# Patient Record
Sex: FEMALE | Race: WHITE | Employment: OTHER | URBAN - METROPOLITAN AREA
[De-identification: names, ages, dates, MRNs, and addresses within clinical notes are randomized per-mention and may not be internally consistent; named-entity substitution may affect disease eponyms.]

---

## 2017-03-17 ENCOUNTER — HOSPITAL ENCOUNTER (OUTPATIENT)
Dept: OTHER | Age: 82
Discharge: HOME | End: 2017-03-17
Attending: INTERNAL MEDICINE | Admitting: INTERNAL MEDICINE

## 2017-03-17 LAB
A/G RATIO: 1.4 (ref 1.1–2.2)
ALBUMIN SERPL-MCNC: 4.2 G/DL (ref 3.4–5)
ALP BLD-CCNC: 146 U/L (ref 40–129)
ALT SERPL-CCNC: 8 U/L (ref 10–40)
ANION GAP SERPL CALCULATED.3IONS-SCNC: 14 MMOL/L (ref 3–16)
AST SERPL-CCNC: 14 U/L (ref 15–37)
BASOPHILS ABSOLUTE: 0.1 K/UL (ref 0–0.2)
BASOPHILS RELATIVE PERCENT: 1 %
BILIRUB SERPL-MCNC: 0.3 MG/DL (ref 0–1)
BILIRUBIN URINE: NEGATIVE
BLOOD, URINE: NEGATIVE
BUN BLDV-MCNC: 14 MG/DL (ref 7–20)
CALCIUM SERPL-MCNC: 9.1 MG/DL (ref 8.3–10.6)
CHLORIDE BLD-SCNC: 97 MMOL/L (ref 99–110)
CHOLESTEROL, TOTAL: 155 MG/DL (ref 0–199)
CLARITY: CLEAR
CO2: 24 MMOL/L (ref 21–32)
COLOR: YELLOW
CREAT SERPL-MCNC: 0.7 MG/DL (ref 0.6–1.2)
EOSINOPHILS ABSOLUTE: 0.2 K/UL (ref 0–0.6)
EOSINOPHILS RELATIVE PERCENT: 2.4 %
EPITHELIAL CELLS, UA: 1 /HPF (ref 0–5)
GFR AFRICAN AMERICAN: >60
GFR NON-AFRICAN AMERICAN: >60
GLOBULIN: 3 G/DL
GLUCOSE BLD-MCNC: 87 MG/DL (ref 70–99)
GLUCOSE URINE: NEGATIVE MG/DL
HCT VFR BLD CALC: 36.8 % (ref 36–48)
HDLC SERPL-MCNC: 56 MG/DL (ref 40–60)
HEMOGLOBIN: 12.1 G/DL (ref 12–16)
HYALINE CASTS: 2 /HPF (ref 0–8)
KETONES, URINE: NEGATIVE MG/DL
LDL CHOLESTEROL CALCULATED: 82 MG/DL
LEUKOCYTE ESTERASE, URINE: ABNORMAL
LYMPHOCYTES ABSOLUTE: 1.5 K/UL (ref 1–5.1)
LYMPHOCYTES RELATIVE PERCENT: 20.6 %
MCH RBC QN AUTO: 28.7 PG (ref 26–34)
MCHC RBC AUTO-ENTMCNC: 32.7 G/DL (ref 31–36)
MCV RBC AUTO: 87.8 FL (ref 80–100)
MICROSCOPIC EXAMINATION: YES
MONOCYTES ABSOLUTE: 0.5 K/UL (ref 0–1.3)
MONOCYTES RELATIVE PERCENT: 6.9 %
NEUTROPHILS ABSOLUTE: 4.9 K/UL (ref 1.7–7.7)
NEUTROPHILS RELATIVE PERCENT: 69.1 %
NITRITE, URINE: NEGATIVE
PDW BLD-RTO: 14.8 % (ref 12.4–15.4)
PH UA: 6
PLATELET # BLD: 330 K/UL (ref 135–450)
PMV BLD AUTO: 7.2 FL (ref 5–10.5)
POTASSIUM SERPL-SCNC: 4.2 MMOL/L (ref 3.5–5.1)
PROTEIN UA: NEGATIVE MG/DL
RBC # BLD: 4.2 M/UL (ref 4–5.2)
RBC UA: 2 /HPF (ref 0–4)
SODIUM BLD-SCNC: 135 MMOL/L (ref 136–145)
SPECIFIC GRAVITY UA: 1.02
TOTAL PROTEIN: 7.2 G/DL (ref 6.4–8.2)
TRIGL SERPL-MCNC: 83 MG/DL (ref 0–150)
TSH SERPL DL<=0.05 MIU/L-ACNC: 1.58 UIU/ML (ref 0.27–4.2)
URINE TYPE: ABNORMAL
UROBILINOGEN, URINE: 0.2 E.U./DL
VLDLC SERPL CALC-MCNC: 17 MG/DL
WBC # BLD: 7.1 K/UL (ref 4–11)
WBC UA: 3 /HPF (ref 0–5)

## 2019-04-05 PROBLEM — R26.81 UNSTEADY GAIT: Status: ACTIVE | Noted: 2019-04-05

## 2019-04-05 PROBLEM — G47.00 INSOMNIA: Status: ACTIVE | Noted: 2019-04-05

## 2019-04-05 PROBLEM — K21.9 ACID REFLUX: Status: ACTIVE | Noted: 2018-12-18

## 2019-04-05 PROBLEM — E04.9 GOITER: Status: ACTIVE | Noted: 2019-04-05

## 2019-04-05 PROBLEM — M81.0 OSTEOPOROSIS: Status: ACTIVE | Noted: 2019-04-05

## 2019-04-05 PROBLEM — M15.9 GENERALIZED OA: Status: ACTIVE | Noted: 2019-04-05

## 2019-04-05 PROBLEM — Z71.89 ADVANCE DIRECTIVE DISCUSSED WITH PATIENT: Status: ACTIVE | Noted: 2019-04-05

## 2019-04-05 PROBLEM — E87.1 HYPONATREMIA: Status: ACTIVE | Noted: 2017-08-26

## 2019-04-09 ENCOUNTER — HOSPITAL ENCOUNTER (OUTPATIENT)
Age: 84
Discharge: HOME | End: 2019-04-09
Payer: MEDICARE

## 2019-04-09 LAB
ALBUMIN SERPL-MCNC: 4.4 G/DL (ref 3.4–5)
ANION GAP SERPL CALCULATED.3IONS-SCNC: 14 MMOL/L (ref 3–16)
BUN BLDV-MCNC: 11 MG/DL (ref 7–20)
CALCIUM SERPL-MCNC: 8.8 MG/DL (ref 8.3–10.6)
CHLORIDE BLD-SCNC: 99 MMOL/L (ref 99–110)
CO2: 26 MMOL/L (ref 21–32)
CREAT SERPL-MCNC: 0.6 MG/DL (ref 0.6–1.2)
CREATININE URINE: 72.1 MG/DL (ref 28–259)
GFR AFRICAN AMERICAN: >60
GFR NON-AFRICAN AMERICAN: >60
GLUCOSE BLD-MCNC: 95 MG/DL (ref 70–99)
HCT VFR BLD CALC: 36.4 % (ref 36–48)
HEMOGLOBIN: 11.9 G/DL (ref 12–16)
MCH RBC QN AUTO: 27.8 PG (ref 26–34)
MCHC RBC AUTO-ENTMCNC: 32.7 G/DL (ref 31–36)
MCV RBC AUTO: 84.9 FL (ref 80–100)
PDW BLD-RTO: 16.2 % (ref 12.4–15.4)
PHOSPHORUS: 3.2 MG/DL (ref 2.5–4.9)
PLATELET # BLD: 426 K/UL (ref 135–450)
PMV BLD AUTO: 7.5 FL (ref 5–10.5)
POTASSIUM SERPL-SCNC: 3.8 MMOL/L (ref 3.5–5.1)
PROTEIN PROTEIN: 9 MG/DL
RBC # BLD: 4.29 M/UL (ref 4–5.2)
SODIUM BLD-SCNC: 139 MMOL/L (ref 136–145)
WBC # BLD: 7.7 K/UL (ref 4–11)

## 2019-04-09 PROCEDURE — 80069 RENAL FUNCTION PANEL: CPT

## 2019-04-09 PROCEDURE — 82570 ASSAY OF URINE CREATININE: CPT

## 2019-04-09 PROCEDURE — 84156 ASSAY OF PROTEIN URINE: CPT

## 2019-04-09 PROCEDURE — 85027 COMPLETE CBC AUTOMATED: CPT

## 2019-04-09 PROCEDURE — 36415 COLL VENOUS BLD VENIPUNCTURE: CPT

## 2021-07-24 ENCOUNTER — APPOINTMENT (OUTPATIENT)
Dept: CT IMAGING | Age: 86
End: 2021-07-24
Payer: MEDICARE

## 2021-07-24 ENCOUNTER — HOSPITAL ENCOUNTER (OUTPATIENT)
Age: 86
Setting detail: OBSERVATION
Discharge: HOME | End: 2021-07-26
Attending: INTERNAL MEDICINE | Admitting: INTERNAL MEDICINE
Payer: MEDICARE

## 2021-07-24 ENCOUNTER — APPOINTMENT (OUTPATIENT)
Dept: GENERAL RADIOLOGY | Age: 86
End: 2021-07-24
Payer: MEDICARE

## 2021-07-24 DIAGNOSIS — R77.8 ELEVATED TROPONIN: ICD-10-CM

## 2021-07-24 DIAGNOSIS — I44.7 LBBB (LEFT BUNDLE BRANCH BLOCK): ICD-10-CM

## 2021-07-24 DIAGNOSIS — A41.9 SEPTICEMIA (HCC): Primary | ICD-10-CM

## 2021-07-24 DIAGNOSIS — N30.00 ACUTE CYSTITIS WITHOUT HEMATURIA: ICD-10-CM

## 2021-07-24 PROBLEM — N39.0 UTI (URINARY TRACT INFECTION): Status: ACTIVE | Noted: 2021-07-24

## 2021-07-24 LAB
A/G RATIO: 1.3 (ref 1.1–2.2)
ALBUMIN SERPL-MCNC: 3.9 G/DL (ref 3.4–5)
ALP BLD-CCNC: 92 U/L (ref 40–129)
ALT SERPL-CCNC: 12 U/L (ref 10–40)
ANION GAP SERPL CALCULATED.3IONS-SCNC: 11 MMOL/L (ref 3–16)
AST SERPL-CCNC: 23 U/L (ref 15–37)
BACTERIA: ABNORMAL /HPF
BASOPHILS ABSOLUTE: 0.1 K/UL (ref 0–0.2)
BASOPHILS RELATIVE PERCENT: 0.5 %
BILIRUB SERPL-MCNC: 0.4 MG/DL (ref 0–1)
BILIRUBIN URINE: NEGATIVE
BLOOD, URINE: NEGATIVE
BUN BLDV-MCNC: 9 MG/DL (ref 7–20)
CALCIUM SERPL-MCNC: 9.4 MG/DL (ref 8.3–10.6)
CHLORIDE BLD-SCNC: 98 MMOL/L (ref 99–110)
CLARITY: ABNORMAL
CO2: 26 MMOL/L (ref 21–32)
COLOR: YELLOW
CREAT SERPL-MCNC: 0.7 MG/DL (ref 0.6–1.2)
EOSINOPHILS ABSOLUTE: 0.1 K/UL (ref 0–0.6)
EOSINOPHILS RELATIVE PERCENT: 0.5 %
EPITHELIAL CELLS, UA: 1 /HPF (ref 0–5)
GFR AFRICAN AMERICAN: >60
GFR NON-AFRICAN AMERICAN: >60
GLOBULIN: 3 G/DL
GLUCOSE BLD-MCNC: 109 MG/DL (ref 70–99)
GLUCOSE BLD-MCNC: 96 MG/DL (ref 70–99)
GLUCOSE URINE: NEGATIVE MG/DL
HCT VFR BLD CALC: 30.9 % (ref 36–48)
HEMOGLOBIN: 10.4 G/DL (ref 12–16)
HYALINE CASTS: 3 /LPF (ref 0–8)
KETONES, URINE: NEGATIVE MG/DL
LACTIC ACID, SEPSIS: 1 MMOL/L (ref 0.4–1.9)
LEUKOCYTE ESTERASE, URINE: ABNORMAL
LIPASE: 19 U/L (ref 13–60)
LYMPHOCYTES ABSOLUTE: 0.3 K/UL (ref 1–5.1)
LYMPHOCYTES RELATIVE PERCENT: 2.4 %
MCH RBC QN AUTO: 29.3 PG (ref 26–34)
MCHC RBC AUTO-ENTMCNC: 33.6 G/DL (ref 31–36)
MCV RBC AUTO: 87.2 FL (ref 80–100)
MICROSCOPIC EXAMINATION: YES
MONOCYTES ABSOLUTE: 0.5 K/UL (ref 0–1.3)
MONOCYTES RELATIVE PERCENT: 4.1 %
NEUTROPHILS ABSOLUTE: 10.5 K/UL (ref 1.7–7.7)
NEUTROPHILS RELATIVE PERCENT: 92.5 %
NITRITE, URINE: POSITIVE
PDW BLD-RTO: 15.3 % (ref 12.4–15.4)
PERFORMED ON: ABNORMAL
PH UA: 7 (ref 5–8)
PLATELET # BLD: 304 K/UL (ref 135–450)
PMV BLD AUTO: 6.8 FL (ref 5–10.5)
POTASSIUM SERPL-SCNC: 3.5 MMOL/L (ref 3.5–5.1)
PRO-BNP: 533 PG/ML (ref 0–449)
PROTEIN UA: NEGATIVE MG/DL
RBC # BLD: 3.54 M/UL (ref 4–5.2)
RBC UA: 2 /HPF (ref 0–4)
SODIUM BLD-SCNC: 135 MMOL/L (ref 136–145)
SPECIFIC GRAVITY UA: 1.01 (ref 1–1.03)
TOTAL PROTEIN: 6.9 G/DL (ref 6.4–8.2)
TROPONIN: 0.03 NG/ML
URINE REFLEX TO CULTURE: YES
URINE TYPE: ABNORMAL
UROBILINOGEN, URINE: 0.2 E.U./DL
WBC # BLD: 11.4 K/UL (ref 4–11)
WBC UA: 14 /HPF (ref 0–5)

## 2021-07-24 PROCEDURE — 36415 COLL VENOUS BLD VENIPUNCTURE: CPT

## 2021-07-24 PROCEDURE — 2580000003 HC RX 258: Performed by: INTERNAL MEDICINE

## 2021-07-24 PROCEDURE — 83690 ASSAY OF LIPASE: CPT

## 2021-07-24 PROCEDURE — 87186 SC STD MICRODIL/AGAR DIL: CPT

## 2021-07-24 PROCEDURE — 99284 EMERGENCY DEPT VISIT MOD MDM: CPT

## 2021-07-24 PROCEDURE — 87088 URINE BACTERIA CULTURE: CPT

## 2021-07-24 PROCEDURE — 6360000002 HC RX W HCPCS: Performed by: INTERNAL MEDICINE

## 2021-07-24 PROCEDURE — 96375 TX/PRO/DX INJ NEW DRUG ADDON: CPT

## 2021-07-24 PROCEDURE — 2580000003 HC RX 258: Performed by: PHYSICIAN ASSISTANT

## 2021-07-24 PROCEDURE — 96365 THER/PROPH/DIAG IV INF INIT: CPT

## 2021-07-24 PROCEDURE — G0378 HOSPITAL OBSERVATION PER HR: HCPCS

## 2021-07-24 PROCEDURE — 85025 COMPLETE CBC W/AUTO DIFF WBC: CPT

## 2021-07-24 PROCEDURE — 71045 X-RAY EXAM CHEST 1 VIEW: CPT

## 2021-07-24 PROCEDURE — 6360000004 HC RX CONTRAST MEDICATION: Performed by: PHYSICIAN ASSISTANT

## 2021-07-24 PROCEDURE — 6370000000 HC RX 637 (ALT 250 FOR IP): Performed by: INTERNAL MEDICINE

## 2021-07-24 PROCEDURE — 96361 HYDRATE IV INFUSION ADD-ON: CPT

## 2021-07-24 PROCEDURE — 1200000000 HC SEMI PRIVATE

## 2021-07-24 PROCEDURE — 87040 BLOOD CULTURE FOR BACTERIA: CPT

## 2021-07-24 PROCEDURE — 6370000000 HC RX 637 (ALT 250 FOR IP): Performed by: PHYSICIAN ASSISTANT

## 2021-07-24 PROCEDURE — 83880 ASSAY OF NATRIURETIC PEPTIDE: CPT

## 2021-07-24 PROCEDURE — 81001 URINALYSIS AUTO W/SCOPE: CPT

## 2021-07-24 PROCEDURE — 83605 ASSAY OF LACTIC ACID: CPT

## 2021-07-24 PROCEDURE — 93005 ELECTROCARDIOGRAM TRACING: CPT | Performed by: INTERNAL MEDICINE

## 2021-07-24 PROCEDURE — 87086 URINE CULTURE/COLONY COUNT: CPT

## 2021-07-24 PROCEDURE — 6360000002 HC RX W HCPCS: Performed by: PHYSICIAN ASSISTANT

## 2021-07-24 PROCEDURE — 80053 COMPREHEN METABOLIC PANEL: CPT

## 2021-07-24 PROCEDURE — 74177 CT ABD & PELVIS W/CONTRAST: CPT

## 2021-07-24 PROCEDURE — 84484 ASSAY OF TROPONIN QUANT: CPT

## 2021-07-24 RX ORDER — NAPROXEN 250 MG/1
500 TABLET ORAL 2 TIMES DAILY WITH MEALS
Status: DISCONTINUED | OUTPATIENT
Start: 2021-07-24 | End: 2021-07-24

## 2021-07-24 RX ORDER — 0.9 % SODIUM CHLORIDE 0.9 %
30 INTRAVENOUS SOLUTION INTRAVENOUS ONCE
Status: COMPLETED | OUTPATIENT
Start: 2021-07-24 | End: 2021-07-24

## 2021-07-24 RX ORDER — DONEPEZIL HYDROCHLORIDE 5 MG/1
5 TABLET, FILM COATED ORAL NIGHTLY
Status: DISCONTINUED | OUTPATIENT
Start: 2021-07-24 | End: 2021-07-26 | Stop reason: HOSPADM

## 2021-07-24 RX ORDER — DONEPEZIL HYDROCHLORIDE 5 MG/1
5 TABLET, FILM COATED ORAL NIGHTLY
COMMUNITY

## 2021-07-24 RX ORDER — M-VIT,TX,IRON,MINS/CALC/FOLIC 27MG-0.4MG
1 TABLET ORAL DAILY
Status: DISCONTINUED | OUTPATIENT
Start: 2021-07-25 | End: 2021-07-26 | Stop reason: HOSPADM

## 2021-07-24 RX ORDER — AMLODIPINE BESYLATE 5 MG/1
10 TABLET ORAL DAILY
Status: DISCONTINUED | OUTPATIENT
Start: 2021-07-24 | End: 2021-07-24

## 2021-07-24 RX ORDER — OYSTER SHELL CALCIUM WITH VITAMIN D 500; 200 MG/1; [IU]/1
1 TABLET, FILM COATED ORAL DAILY
Status: DISCONTINUED | OUTPATIENT
Start: 2021-07-25 | End: 2021-07-26 | Stop reason: HOSPADM

## 2021-07-24 RX ORDER — HYDROCODONE BITARTRATE AND ACETAMINOPHEN 5; 325 MG/1; MG/1
1 TABLET ORAL EVERY 6 HOURS PRN
Status: DISCONTINUED | OUTPATIENT
Start: 2021-07-24 | End: 2021-07-24

## 2021-07-24 RX ORDER — LISINOPRIL 5 MG/1
5 TABLET ORAL DAILY
Status: DISCONTINUED | OUTPATIENT
Start: 2021-07-25 | End: 2021-07-25

## 2021-07-24 RX ORDER — ACETAMINOPHEN 500 MG
1000 TABLET ORAL ONCE
Status: COMPLETED | OUTPATIENT
Start: 2021-07-24 | End: 2021-07-24

## 2021-07-24 RX ORDER — PANTOPRAZOLE SODIUM 40 MG/1
40 TABLET, DELAYED RELEASE ORAL
Status: DISCONTINUED | OUTPATIENT
Start: 2021-07-25 | End: 2021-07-24

## 2021-07-24 RX ORDER — ASPIRIN 81 MG/1
81 TABLET, CHEWABLE ORAL DAILY
COMMUNITY

## 2021-07-24 RX ORDER — METOPROLOL SUCCINATE 50 MG/1
50 TABLET, EXTENDED RELEASE ORAL DAILY
Status: DISCONTINUED | OUTPATIENT
Start: 2021-07-24 | End: 2021-07-24

## 2021-07-24 RX ORDER — M-VIT,TX,IRON,MINS/CALC/FOLIC 27MG-0.4MG
1 TABLET ORAL DAILY
COMMUNITY

## 2021-07-24 RX ORDER — ASPIRIN 81 MG/1
324 TABLET, CHEWABLE ORAL ONCE
Status: COMPLETED | OUTPATIENT
Start: 2021-07-24 | End: 2021-07-24

## 2021-07-24 RX ORDER — LISINOPRIL AND HYDROCHLOROTHIAZIDE 20; 12.5 MG/1; MG/1
1 TABLET ORAL DAILY
Status: DISCONTINUED | OUTPATIENT
Start: 2021-07-24 | End: 2021-07-24

## 2021-07-24 RX ORDER — SODIUM CHLORIDE 1000 MG
1 TABLET, SOLUBLE MISCELLANEOUS 2 TIMES DAILY WITH MEALS
Status: DISCONTINUED | OUTPATIENT
Start: 2021-07-24 | End: 2021-07-24

## 2021-07-24 RX ORDER — ACETAMINOPHEN 325 MG/1
650 TABLET ORAL EVERY 4 HOURS PRN
Status: ON HOLD | COMMUNITY
End: 2021-07-26 | Stop reason: HOSPADM

## 2021-07-24 RX ORDER — LEVOTHYROXINE SODIUM 112 UG/1
112 TABLET ORAL DAILY
Status: DISCONTINUED | OUTPATIENT
Start: 2021-07-25 | End: 2021-07-26 | Stop reason: HOSPADM

## 2021-07-24 RX ORDER — FENOFIBRATE 160 MG/1
160 TABLET ORAL DAILY
Status: DISCONTINUED | OUTPATIENT
Start: 2021-07-24 | End: 2021-07-24

## 2021-07-24 RX ORDER — MONTELUKAST SODIUM 10 MG/1
10 TABLET ORAL NIGHTLY
Status: DISCONTINUED | OUTPATIENT
Start: 2021-07-24 | End: 2021-07-26 | Stop reason: HOSPADM

## 2021-07-24 RX ORDER — OXYCODONE HYDROCHLORIDE 5 MG/1
5 TABLET ORAL EVERY 4 HOURS PRN
Status: DISCONTINUED | OUTPATIENT
Start: 2021-07-24 | End: 2021-07-24

## 2021-07-24 RX ORDER — SERTRALINE HYDROCHLORIDE 100 MG/1
100 TABLET, FILM COATED ORAL DAILY
COMMUNITY

## 2021-07-24 RX ORDER — ASPIRIN 81 MG/1
81 TABLET, CHEWABLE ORAL DAILY
Status: DISCONTINUED | OUTPATIENT
Start: 2021-07-24 | End: 2021-07-26 | Stop reason: HOSPADM

## 2021-07-24 RX ORDER — LEVOTHYROXINE SODIUM 0.03 MG/1
50 TABLET ORAL DAILY
Status: DISCONTINUED | OUTPATIENT
Start: 2021-07-25 | End: 2021-07-24 | Stop reason: SDUPTHER

## 2021-07-24 RX ORDER — SODIUM CHLORIDE 9 MG/ML
INJECTION, SOLUTION INTRAVENOUS CONTINUOUS
Status: DISCONTINUED | OUTPATIENT
Start: 2021-07-24 | End: 2021-07-26

## 2021-07-24 RX ORDER — ATORVASTATIN CALCIUM 40 MG/1
40 TABLET, FILM COATED ORAL DAILY
Status: DISCONTINUED | OUTPATIENT
Start: 2021-07-24 | End: 2021-07-26 | Stop reason: HOSPADM

## 2021-07-24 RX ORDER — ONDANSETRON 2 MG/ML
4 INJECTION INTRAMUSCULAR; INTRAVENOUS ONCE
Status: COMPLETED | OUTPATIENT
Start: 2021-07-24 | End: 2021-07-24

## 2021-07-24 RX ADMIN — ASPIRIN 324 MG: 81 TABLET, CHEWABLE ORAL at 16:46

## 2021-07-24 RX ADMIN — ATORVASTATIN CALCIUM 40 MG: 40 TABLET, FILM COATED ORAL at 22:02

## 2021-07-24 RX ADMIN — CEFEPIME HYDROCHLORIDE 1000 MG: 1 INJECTION, POWDER, FOR SOLUTION INTRAMUSCULAR; INTRAVENOUS at 16:16

## 2021-07-24 RX ADMIN — DONEPEZIL HYDROCHLORIDE 5 MG: 5 TABLET, FILM COATED ORAL at 22:02

## 2021-07-24 RX ADMIN — Medication 1000 MG: at 21:49

## 2021-07-24 RX ADMIN — ACETAMINOPHEN 1000 MG: 500 TABLET ORAL at 13:28

## 2021-07-24 RX ADMIN — SODIUM CHLORIDE: 9 INJECTION, SOLUTION INTRAVENOUS at 21:59

## 2021-07-24 RX ADMIN — SODIUM CHLORIDE 1686 ML: 9 INJECTION, SOLUTION INTRAVENOUS at 13:33

## 2021-07-24 RX ADMIN — ONDANSETRON 4 MG: 2 INJECTION INTRAMUSCULAR; INTRAVENOUS at 13:32

## 2021-07-24 RX ADMIN — MONTELUKAST SODIUM 10 MG: 10 TABLET, FILM COATED ORAL at 22:02

## 2021-07-24 RX ADMIN — IOPAMIDOL 75 ML: 755 INJECTION, SOLUTION INTRAVENOUS at 15:03

## 2021-07-24 RX ADMIN — SERTRALINE 100 MG: 50 TABLET, FILM COATED ORAL at 22:02

## 2021-07-24 ASSESSMENT — PAIN SCALES - GENERAL
PAINLEVEL_OUTOF10: 0
PAINLEVEL_OUTOF10: 0

## 2021-07-24 ASSESSMENT — ENCOUNTER SYMPTOMS
SHORTNESS OF BREATH: 0
COUGH: 0
VOMITING: 1
ABDOMINAL PAIN: 0
RHINORRHEA: 0
NAUSEA: 0
DIARRHEA: 1

## 2021-07-24 NOTE — ED NOTES
Bed: 19  Expected date:   Expected time:   Means of arrival: [facility]  Comments:  Medic [address] [place], [address] [facility]  07/24/21 3405

## 2021-07-24 NOTE — ED PROVIDER NOTES
I did not see this patient personally. I only interpreted their EKG.   Reveals:  Normal sinus rhythm  Heart rate 97    Compared to 2016 ekg, has new LBBB   No sgarbossa criteria met  Communicated this to ANA Moran MD  07/24/21 6127

## 2021-07-24 NOTE — ED PROVIDER NOTES
[address] [facility]        Pt Name: Lizandro Arrington  MRN: [id number]  [place] 1935  Date of evaluation: 7/24/2021  Provider: Bennett Arguelles PA-C  PCP: Cary Otto  Note Started: 4:23 PM EDT       BLU. I have evaluated this patient. My supervising physician was available for consultation. CHIEF COMPLAINT       Chief Complaint   Patient presents with    Fever     Pt brought in per EMS from [facility] with fever, emesis, and feeling weak. Pt has hx dementia. Denies pain. Family also reported diarrhea- \"stomach flu going around\".  Emesis       HISTORY OF PRESENT ILLNESS   (Location, Timing/Onset, Context/Setting, Quality, Duration, Modifying Factors, Severity, Associated Signs and Symptoms)  Note limiting factors. Chief Complaint: Fever, and vomiting    Lizandro Arrington is a 82 y.o. female who presents to the emergency department today with her  for evaluation for fever and vomiting. Patient woke up this morning, and she did have a fever of 101, the patient is febrile when she arrives to the ED. The patient is nauseous, and has had several episodes of nonbloody nonbilious emesis. The patient states that she did have some looser stool but she denies any blood in the stool or black tarry appearance of the stool. Patient denies any cough or congestion. No abdominal pain. She denies any urinary symptoms. Patient has been vaccinated for COVID-19. The patient does live at [facility], and apparently there is a \"stomach bug\" going around. Nursing Notes were all reviewed and agreed with or any disagreements were addressed in the HPI. REVIEW OF SYSTEMS    (2-9 systems for level 4, 10 or more for level 5)     Review of Systems   Constitutional: Positive for fever. Negative for activity change, appetite change and chills. HENT: Negative for congestion and rhinorrhea.     Respiratory: Negative for cough and shortness of breath. Cardiovascular: Negative for chest pain. Gastrointestinal: Positive for diarrhea and vomiting. Negative for abdominal pain and nausea. Genitourinary: Negative for difficulty urinating, dysuria and hematuria. Positives and Pertinent negatives as per HPI. Except as noted above in the ROS, all other systems were reviewed and negative. PAST MEDICAL HISTORY     Past Medical History:   Diagnosis Date    Hyperlipidemia     Hypertension     Thyroid disease          SURGICAL HISTORY     Past Surgical History:   Procedure Laterality Date    ABDOMINAL ADHESION SURGERY  08/24/2016    ABDOMINAL HERNIA REPAIR  08/16/2016    incarcerated hernia    SMALL INTESTINE SURGERY  08/16/2016    small bowel resect    TOTAL HIP ARTHROPLASTY      both hips         CURRENTMEDICATIONS       Previous Medications    AMLODIPINE (NORVASC) 10 MG TABLET        ASPIRIN 81 MG CHEWABLE TABLET    Take 81 mg by mouth daily    ATORVASTATIN (LIPITOR) 20 MG TABLET    Take 20 mg by mouth daily. ATORVASTATIN (LIPITOR) 40 MG TABLET        CALCIUM 600-400 MG-UNIT CHEW    Take by mouth    DONEPEZIL (ARICEPT) 5 MG TABLET    Take 5 mg by mouth nightly    FENOFIBRATE (TRICOR) 145 MG TABLET    Take 48 mg by mouth daily. HYDROCODONE-ACETAMINOPHEN (NORCO) 5-325 MG PER TABLET    Take 1 tablet by mouth every 6 hours as needed for Pain. LEVOTHYROXINE (SYNTHROID) 100 MCG TABLET    Take 112 mcg by mouth daily     LISINOPRIL (PRINIVIL;ZESTRIL) 20 MG TABLET        LISINOPRIL-HYDROCHLOROTHIAZIDE (PRINZIDE;ZESTORETIC) 20-12.5 MG PER TABLET    Take 1 tablet by mouth daily. METOPROLOL (TOPROL-XL) 50 MG XL TABLET    Take 50 mg by mouth daily. MONTELUKAST (SINGULAIR) 10 MG TABLET    Take 10 mg by mouth nightly. MULTIPLE VITAMINS-MINERALS (THERAPEUTIC MULTIVITAMIN-MINERALS) TABLET    Take 1 tablet by mouth daily    NAPROXEN (NAPROSYN) 500 MG TABLET    Take 1 tablet by mouth 2 times daily (with meals) for 15 days. OMEPRAZOLE (PRILOSEC) 20 MG CAPSULE    Take 40 mg by mouth daily. OXYCODONE (ROXICODONE) 5 MG IMMEDIATE RELEASE TABLET    Take 1 tablet by mouth every 4 hours as needed for Pain (1-2 tabs by mouth every 4 hours as needed for pain)    SERTRALINE (ZOLOFT) 100 MG TABLET    Take 100 mg by mouth daily    SODIUM CHLORIDE 1 G TABLET    sodium chloride 1 gram tablet   TAKE ONE TABLET BY MOUTH EVERY 8 HOURS    SYNTHROID 88 MCG TABLET             ALLERGIES     Codeine    FAMILYHISTORY       Family History   Problem Relation Age of Onset    High Blood Pressure Other           SOCIAL HISTORY       Social History     Tobacco Use    Smoking status: Current Every Day Smoker     Packs/day: 0.25     Years: 30.00     Pack years: 7.50     Types: Cigarettes    Smokeless tobacco: Never Used   Substance Use Topics    Alcohol use: No    Drug use: No       SCREENINGS             PHYSICAL EXAM    (up to 7 for level 4, 8 or more for level 5)     ED Triage Vitals [07/24/21 1249]   BP Temp Temp Source Pulse Resp SpO2 Height Weight   (!) 141/87 101.2 °F (38.4 °C) Oral 100 20 94 % 5' 4\" (1.626 m) 124 lb (56.2 kg)       Physical Exam  Vitals and nursing note reviewed. Constitutional:       Appearance: She is well-developed. She is ill-appearing. She is not diaphoretic. HENT:      Head: Normocephalic and atraumatic. Right Ear: External ear normal.      Left Ear: External ear normal.      Nose: Nose normal.   Eyes:      General:         Right eye: No discharge. Left eye: No discharge. Neck:      Trachea: No tracheal deviation. Cardiovascular:      Rate and Rhythm: Regular rhythm. Tachycardia present. Heart sounds: No murmur heard. Pulmonary:      Effort: Pulmonary effort is normal. No respiratory distress. Breath sounds: Normal breath sounds. No wheezing or rales. Abdominal:      General: Bowel sounds are normal. There is no distension. Palpations: Abdomen is soft. Tenderness:  There is no abdominal tenderness. There is no guarding. Musculoskeletal:         General: Normal range of motion. Cervical back: Normal range of motion and neck supple. Skin:     General: Skin is warm and dry. Neurological:      Mental Status: She is alert and oriented to person, place, and time.    Psychiatric:         Behavior: Behavior normal.         DIAGNOSTIC RESULTS   LABS:    Labs Reviewed   CBC WITH AUTO DIFFERENTIAL - Abnormal; Notable for the following components:       Result Value    WBC 11.4 (*)     RBC 3.54 (*)     Hemoglobin 10.4 (*)     Hematocrit 30.9 (*)     Neutrophils Absolute 10.5 (*)     Lymphocytes Absolute 0.3 (*)     All other components within normal limits    Narrative:     Performed at:  [facility] [address], [facility], [address]   Phone [phone]   COMPREHENSIVE METABOLIC PANEL - Abnormal; Notable for the following components:    Sodium 135 (*)     Chloride 98 (*)     All other components within normal limits    Narrative:     Performed at:  [facility] [address], [facility], [address]   Phone [phone]   URINE RT REFLEX TO CULTURE - Abnormal; Notable for the following components:    Clarity, UA CLOUDY (*)     Nitrite, Urine POSITIVE (*)     Leukocyte Esterase, Urine SMALL (*)     All other components within normal limits    Narrative:     Performed at:  [facility] [address], [facility], [address]   Phone [phone]   TROPONIN - Abnormal; Notable for the following components:    Troponin 0.03 (*)     All other components within normal limits    Narrative:     Performed at:  [facility] [address], [facility], [address]   Phone 21  - Abnormal; Notable for the following components:    Pro- (*)     All other components within normal limits    Narrative:     Performed at:  [facility]  [address], [address] [facility]   Phone [phone]   MICROSCOPIC URINALYSIS - Abnormal; Notable for the following components:    Bacteria, UA 4+ (*)     WBC, UA 14 (*)     All other components within normal limits    Narrative:     Performed at:  [facility]  [address], [address] [facility]   Phone [phone]   CULTURE, BLOOD 2   CULTURE, BLOOD 1   CULTURE, URINE   LIPASE    Narrative:     Performed at:  [facility]  [address], [address] [facility]   Phone [phone]   LACTATE, SEPSIS    Narrative:     12  Performed at:  [facility]  [address], [address] [facility]   Phone [phone]   LACTATE, SEPSIS       When ordered only abnormal lab results are displayed. All other labs were within normal range or not returned as of this dictation. EKG: When ordered, EKG's are interpreted by the Emergency Department Physician in the absence of a cardiologist.  Please see their note for interpretation of EKG. RADIOLOGY:   Non-plain film images such as CT, Ultrasound and MRI are read by the radiologist. Plain radiographic images are visualized and preliminarily interpreted by the ED Provider with the below findings:        Interpretation per the Radiologist below, if available at the time of this note:    CT ABDOMEN PELVIS W IV CONTRAST Additional Contrast? None   Preliminary Result   1. No acute findings within the abdomen or pelvis. 2. Colonic diverticulosis with no acute features. 3. Atherosclerotic calcification in the aorta and coronary circulation.          XR CHEST PORTABLE   Final Result   No active cardiopulmonary disease           CT ABDOMEN PELVIS W IV CONTRAST Additional Contrast? None    Result Date: 7/24/2021  EXAMINATION: CT OF THE ABDOMEN AND PELVIS WITH CONTRAST 7/24/2021 2:26 pm TECHNIQUE: CT of the abdomen and pelvis was performed with the administration of intravenous contrast. Multiplanar reformatted images are provided for review. Dose modulation, iterative reconstruction, and/or weight based adjustment of the mA/kV was utilized to reduce the radiation dose to as low as reasonably achievable. COMPARISON: 08/23/2016. HISTORY: ORDERING SYSTEM PROVIDED HISTORY: N?Vfever TECHNOLOGIST PROVIDED HISTORY: Reason for exam:->N? Vfever Additional Contrast?->None Decision Support Exception - unselect if not a suspected or confirmed emergency medical condition->Emergency Medical Condition (MA) Reason for Exam: Fever (Pt brought in per EMS from [facility] with fever, emesis, and feeling weak. Pt has hx dementia. Denies pain. Family also reported diarrhea- \"stomach flu going around\".) FINDINGS: Lower Chest: No acute infiltrate at the lung bases. Atelectatic changes in the posterior costophrenic sulci bilaterally. Coronary vascular calcification. Organs:  0.5 cm left hepatic lesion, too small to fully characterize but likely a cyst or hemangioma. Mild decreased attenuation of the hepatic parenchyma. No acute biliary findings. The spleen, pancreas and adrenal glands are unremarkable. No renal mass or significant hydronephrosis. 3.1 cm right renal cyst. GI/Bowel: Colonic diverticulosis with no acute features. The appendix is unremarkable. No small bowel distension. Previous small bowel resection with suture row in the right lower quadrant. The stomach and duodenal sweep intact. Pelvis: Evaluation of the pelvis is limited by beam hardening artifact from bilateral hip prostheses. No obvious pelvic mass or significant free fluid. Peritoneum/Retroperitoneum: The abdominal aorta is normal in caliber. Heavy calcified aortoiliac atherosclerotic plaque. No pathologic retroperitoneal adenopathy or upper abdominal ascites. A persistent left-sided IVC is noted. Bones/Soft Tissues: No acute osseous or soft tissue abnormality.      1. No acute findings within the abdomen or pelvis. 2. Colonic diverticulosis with no acute features. 3. Atherosclerotic calcification in the aorta and coronary circulation. XR CHEST PORTABLE    Result Date: 7/24/2021  EXAMINATION: ONE XRAY VIEW OF THE CHEST 7/24/2021 1:23 pm COMPARISON: None. HISTORY: ORDERING SYSTEM PROVIDED HISTORY: SOB TECHNOLOGIST PROVIDED HISTORY: Reason for exam:->SOB Reason for Exam: Fever (Pt brought in per EMS from [facility] with fever, emesis, and feeling weak. Pt has hx dementia. Denies pain. Family also reported diarrhea- \"stomach flu going around\".) FINDINGS: Heart size and pulmonary vasculature within normal limits. Lungs clear. Costophrenic angles sharp     No active cardiopulmonary disease           PROCEDURES   Unless otherwise noted below, none     Procedures    CRITICAL CARE TIME   Critical Care  There was a high probability of life-threatening clinical deterioration in the patient's condition requiring my urgent intervention. Total critical care time with the patient was 35 minutes excluding separately reportable procedures.   Critical care required due to patients sepsis, requiring extensive work-up, broad-spectrum antibiotics, admission      CONSULTS:  IP CONSULT TO PRIMARY CARE PROVIDER  IP CONSULT TO CARDIOLOGY      EMERGENCY DEPARTMENT COURSE and DIFFERENTIAL DIAGNOSIS/MDM:   Vitals:    Vitals:    07/24/21 1500 07/24/21 1515 07/24/21 1530 07/24/21 1545   BP: 137/69 (!) 141/64 (!) 132/92 122/63   Pulse: 86 93 96 85   Resp: 20 20 19 20   Temp:    98.9 °F (37.2 °C)   TempSrc:    Oral   SpO2: 100%  95% 100%   Weight:       Height:           Patient was given the following medications:  Medications   cefepime (MAXIPIME) 1000 mg IVPB minibag (1,000 mg Intravenous New Bag 7/24/21 1616)   aspirin chewable tablet 324 mg (has no administration in time range)   0.9 % sodium chloride bolus (0 mLs Intravenous Stopped 7/24/21 1550)   acetaminophen (TYLENOL) tablet 1,000 mg (1,000 mg Oral Given 7/24/21 4718)   ondansetron (ZOFRAN) injection 4 mg (4 mg Intravenous Given 7/24/21 1332)   iopamidol (ISOVUE-370) 76 % injection 75 mL (75 mLs Intravenous Given 7/24/21 1503)           Briefly, this is an 77-year-old female who presents to the emergency department today for evaluation for fever, vomiting and diarrhea which began this morning    Patient is mildly ill-appearing on examination she is tachycardic however she is acutely febrile. Her abdomen is benign    EKG as documented by my attending, Dr. Kyung Parra, please see his note for further details. CBC shows leukocytosis of 11.4, mild anemia. Her CMP is unremarkable. Her troponin is elevated 0.03 however patient has no chest pain. No shortness of breath. Her lactic acid is normal.    Chest x-ray is negative. CT of the abdomen is negative, urine does show infection,  With positive nitrites    Clinically feel that the elevated troponin is likely due to the patient's sepsis, however she does have a new left bundle branch block, did discuss with with cardiology, recommended an aspirin, no other recommendations at this time. The patient will be given cefepime, and she will need to be admitted for further care and evaluation, Dr. Jennifer Johnston has been paged for admission, the patient is updated and agreeable with plan. Stable for admission    SEP-1 CORE MEASURE DATA    Classification: sepsis    Amount of fluids ordered: at least 30mL/kg based on ideal body weight due to obesity defined as BMI >30 (patient's BMI is Body mass index is 21.28 kg/m². and IBW is Ideal body weight: 54.7 kg (120 lb 9.5 oz)Adjusted ideal body weight: 55.3 kg (121 lb 15.3 oz))    Time at which sepsis was identified: 4:00 PM    Broad-spectrum antibiotics chosen: cefepime based on suspected source of: UTI    Repeat lactate level: not indicated due to initial lactate < 2    On reassessment after fluid resuscitation:   pending, to be completed by inpatient team    FINAL IMPRESSION      1.  Septicemia ([facility] [address]    2. Acute cystitis without hematuria    3. LBBB (left bundle branch block)    4. Elevated troponin          DISPOSITION/PLAN   DISPOSITION Decision To Admit 07/24/2021 03:59:58 PM      PATIENT REFERRED TO:  No follow-up provider specified.     DISCHARGE MEDICATIONS:  New Prescriptions    No medications on file       DISCONTINUED MEDICATIONS:  Discontinued Medications    No medications on file              (Please note that portions of this note were completed with a voice recognition program.  Efforts were made to edit the dictations but occasionally words are mis-transcribed.)    Ashley Cosby PA-C (electronically signed)            Ashley Cosby PA-C  07/24/21 2664

## 2021-07-25 LAB
ANION GAP SERPL CALCULATED.3IONS-SCNC: 9 MMOL/L (ref 3–16)
BUN BLDV-MCNC: 8 MG/DL (ref 7–20)
CALCIUM SERPL-MCNC: 8.1 MG/DL (ref 8.3–10.6)
CHLORIDE BLD-SCNC: 104 MMOL/L (ref 99–110)
CO2: 26 MMOL/L (ref 21–32)
CREAT SERPL-MCNC: 0.7 MG/DL (ref 0.6–1.2)
EKG ATRIAL RATE: 97 BPM
EKG DIAGNOSIS: NORMAL
EKG P AXIS: 70 DEGREES
EKG P-R INTERVAL: 176 MS
EKG Q-T INTERVAL: 412 MS
EKG QRS DURATION: 134 MS
EKG QTC CALCULATION (BAZETT): 523 MS
EKG R AXIS: 41 DEGREES
EKG T AXIS: 126 DEGREES
EKG VENTRICULAR RATE: 97 BPM
GFR AFRICAN AMERICAN: >60
GFR NON-AFRICAN AMERICAN: >60
GLUCOSE BLD-MCNC: 90 MG/DL (ref 70–99)
HCT VFR BLD CALC: 31 % (ref 36–48)
HEMOGLOBIN: 10.2 G/DL (ref 12–16)
MCH RBC QN AUTO: 29.1 PG (ref 26–34)
MCHC RBC AUTO-ENTMCNC: 33 G/DL (ref 31–36)
MCV RBC AUTO: 88.2 FL (ref 80–100)
PDW BLD-RTO: 15.5 % (ref 12.4–15.4)
PLATELET # BLD: 293 K/UL (ref 135–450)
PMV BLD AUTO: 7 FL (ref 5–10.5)
POTASSIUM SERPL-SCNC: 3.7 MMOL/L (ref 3.5–5.1)
RBC # BLD: 3.52 M/UL (ref 4–5.2)
SODIUM BLD-SCNC: 139 MMOL/L (ref 136–145)
TROPONIN: 0.01 NG/ML
WBC # BLD: 9.7 K/UL (ref 4–11)

## 2021-07-25 PROCEDURE — 96376 TX/PRO/DX INJ SAME DRUG ADON: CPT

## 2021-07-25 PROCEDURE — G0378 HOSPITAL OBSERVATION PER HR: HCPCS

## 2021-07-25 PROCEDURE — 6360000002 HC RX W HCPCS: Performed by: INTERNAL MEDICINE

## 2021-07-25 PROCEDURE — 96361 HYDRATE IV INFUSION ADD-ON: CPT

## 2021-07-25 PROCEDURE — 80048 BASIC METABOLIC PNL TOTAL CA: CPT

## 2021-07-25 PROCEDURE — 2580000003 HC RX 258: Performed by: INTERNAL MEDICINE

## 2021-07-25 PROCEDURE — 6370000000 HC RX 637 (ALT 250 FOR IP): Performed by: INTERNAL MEDICINE

## 2021-07-25 PROCEDURE — 93010 ELECTROCARDIOGRAM REPORT: CPT | Performed by: INTERNAL MEDICINE

## 2021-07-25 PROCEDURE — 92610 EVALUATE SWALLOWING FUNCTION: CPT

## 2021-07-25 PROCEDURE — 36415 COLL VENOUS BLD VENIPUNCTURE: CPT

## 2021-07-25 PROCEDURE — 92526 ORAL FUNCTION THERAPY: CPT

## 2021-07-25 PROCEDURE — 85027 COMPLETE CBC AUTOMATED: CPT

## 2021-07-25 PROCEDURE — 96372 THER/PROPH/DIAG INJ SC/IM: CPT

## 2021-07-25 PROCEDURE — 84484 ASSAY OF TROPONIN QUANT: CPT

## 2021-07-25 RX ORDER — HYDROCHLOROTHIAZIDE 25 MG/1
12.5 TABLET ORAL DAILY
Status: DISCONTINUED | OUTPATIENT
Start: 2021-07-25 | End: 2021-07-26 | Stop reason: HOSPADM

## 2021-07-25 RX ORDER — LISINOPRIL 10 MG/1
10 TABLET ORAL DAILY
Status: DISCONTINUED | OUTPATIENT
Start: 2021-07-26 | End: 2021-07-26

## 2021-07-25 RX ORDER — AMLODIPINE BESYLATE 5 MG/1
5 TABLET ORAL DAILY
Status: DISCONTINUED | OUTPATIENT
Start: 2021-07-25 | End: 2021-07-26

## 2021-07-25 RX ADMIN — SODIUM CHLORIDE: 9 INJECTION, SOLUTION INTRAVENOUS at 08:03

## 2021-07-25 RX ADMIN — ATORVASTATIN CALCIUM 40 MG: 40 TABLET, FILM COATED ORAL at 08:11

## 2021-07-25 RX ADMIN — AMLODIPINE BESYLATE 5 MG: 5 TABLET ORAL at 16:44

## 2021-07-25 RX ADMIN — LEVOTHYROXINE SODIUM 112 MCG: 0.11 TABLET ORAL at 06:06

## 2021-07-25 RX ADMIN — SERTRALINE 100 MG: 50 TABLET, FILM COATED ORAL at 08:11

## 2021-07-25 RX ADMIN — CALCIUM CARBONATE-VITAMIN D TAB 500 MG-200 UNIT 1 TABLET: 500-200 TAB at 08:12

## 2021-07-25 RX ADMIN — ASPIRIN 81 MG: 81 TABLET, CHEWABLE ORAL at 08:11

## 2021-07-25 RX ADMIN — HYDROCHLOROTHIAZIDE 12.5 MG: 25 TABLET ORAL at 16:43

## 2021-07-25 RX ADMIN — LISINOPRIL 5 MG: 5 TABLET ORAL at 08:11

## 2021-07-25 RX ADMIN — MONTELUKAST SODIUM 10 MG: 10 TABLET, FILM COATED ORAL at 20:42

## 2021-07-25 RX ADMIN — DONEPEZIL HYDROCHLORIDE 5 MG: 5 TABLET, FILM COATED ORAL at 20:42

## 2021-07-25 RX ADMIN — ENOXAPARIN SODIUM 40 MG: 40 INJECTION SUBCUTANEOUS at 08:11

## 2021-07-25 RX ADMIN — Medication 1 TABLET: at 08:11

## 2021-07-25 RX ADMIN — Medication 1000 MG: at 20:41

## 2021-07-25 ASSESSMENT — PAIN SCALES - GENERAL
PAINLEVEL_OUTOF10: 0

## 2021-07-25 NOTE — PROGRESS NOTES
Message sent to Dr. Sarah Yost: I just received paperwork from [facility] where patient is from. One of her allergies is cephalosporins. Rocephin is ordered Q24 hours. I already gave her this evening's dose about an hour ago. Would you like to discontinue Rocephin and order another abx? Please advise    2253 Message received: Any side effects. ? Then just watch for 24 hours till next dose due     2254 Message sent: No side effects as of right now. I will continue to monitor throughout night.

## 2021-07-25 NOTE — PROGRESS NOTES
Facility/Department: [address] ONCOLOGY  Initial Assessment  DYSPHAGIA BEDSIDE SWALLOW EVALUATION     Patient: Selene Pennington   : 1935   MRN: [id number]      Evaluation Date: 2021   Admitting Diagnosis: UTI (urinary tract infection) [N39.0]  Pain: denied                        H&P: \"Susu Chase is a 82 y.o. female who presents to the emergency department today with her  for evaluation for fever and vomiting. Patient woke up this morning, and she did have a fever of 101, the patient is febrile when she arrives to the ED. The patient is nauseous, and has had several episodes of nonbloody nonbilious emesis. The patient states that she did have some looser stool but she denies any blood in the stool or black tarry appearance of the stool. Patient denies any cough or congestion. No abdominal pain. She denies any urinary symptoms. Patient has been vaccinated for COVID-19. The patient does live at [facility], and apparently there is a \"stomach bug\" going around. \"    Recent Chest xray: 21  Impression   No active cardiopulmonary disease           History/Prior Level of Function:   Living Status: [facility]    Prior Dysphagia History: denies hx dysphagia  RN reported \"gulping sound\" when swallowing liquids. 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 Pt wears upper/lower partial. Lingual/labial strength/ROM/coordination appear grossly WFL at this time. Pt is currently on 2L O2 via NC, she does not wear O2 at baseline. Several family members present at bedside and indicate no hx pneumonia or dysphagia. Pt was given trials of thin liquids, pureed solids, soft solids, regular textures, and mixed consistency. Oral phase is characterized by min prolonged mastication and bolus manipulation time and suspected minimal premature spillage to pharynx.  Pharyngeal phase is characterized by minimally delayed swallow initiation and reduced laryngeal elevation. All of these are minimal and seem related to presbyphagia and do not appear to impact swallow function or place pt at higher risk of aspiration. No overt s/s aspiration were noted. Therefore, recommend continue regular texture diet/thin liquids. No further dysphagia tx is warranted at this time. Recommended Diet and Intervention 7/25/2021:  Diet Solids Recommendation:  Regular texture Liquid Consistency Recommendation: Thin liquids Recommended form of Meds:   Whole with water or as tolerated     Compensatory Swallowing Strategies: Alternate solids/liquids , Upright as possible with all PO intake , Small bites/sips     SHORT TERM DYSPHAGIA GOALS/PLAN OF CARE: No dysphagia tx warranted at this time. Dysphagia Therapeutic Intervention:  Other: No further dysphagia tx warranted at this time.     Discharge Recommendations: Pt does not requires speech therapy for dysphagia therapy upon discharge from hospital    Patient Positioning: Upright in bed    Current Diet Level (prior to evaluation): regular texture/thin liquids    Respiratory Status:   []Room Air   [x]O2 via nasal cannula (2L)   []Other:    Dentition:  [x]Adequate  [x]Dentures (patrials)  []Missing Many Teeth  []Edentulous  []Other:    Baseline Vocal Quality:  [x]Normal  []Dysphonic   []Aphonic   []Hoarse  []Wet  []Weak  []Other:    Volitional Cough:  [x]Strong  []Weak  []Wet  []Absent  []Congested  []Other:    Volitional Swallow:   []Absent   [x]Delayed (age appropriate)   [x]Adequate     []Required use of drink     Oral Mechanism Exam:  [x]WFL []Mild   [] Moderate  []Severe  []To be assessed  Impaired:   []Left side      []Right side    []Labial ROM/Coordination    []Labial Symmetry   []Lingual ROM/Coordination   []Lingual Symmetry  []Gag  []Other:     Oral Phase: [x]WFL/minimal []Mild   [] Moderate  []Severe  []To be assessed   [x]Impaired/Prolonged Mastication:   []Spillage Left:   []Spillage Right:  []Pocketing Left:   []Pocketing Right:   []Decreased Anterior to Posterior Transit:   [x]Suspected Premature Bolus Loss:   []Lingual/Palatal Residue:   []Other:     Pharyngeal Phase: [x]WFL/minimal []Mild   [] Moderate  []Severe  []To be assessed   [x]Delayed Swallow:   []Suspected Pharyngeal Pooling:   [x]Decreased Laryngeal Elevation:   []Absent Swallow:  []Wet Vocal Quality:   []Throat Clearing-Immediate:   []Throat Clearing-Delayed:   []Cough-Immediate:   []Cough-Delayed:  []Change in Vital Signs:  []Suspected Delayed Pharyngeal Clearing:  []Other:       Eating Assistance:  [x]Independent  []Setup or clean-up assistance   [] Supervision or touching assistance   [] Partial or moderate assistance   [] Substantial or maximal assistance  [] Dependent       EDUCATION:   Provided education regarding role of SLP, results of assessment, recommendations and general speech pathology plan of care. [x] Pt/family verbalized understanding and agreement   [] Pt requires ongoing learning   [] No evidence of comprehension     If patient discharges prior to next visit, this note will serve as discharge. Timed Code Minutes: 0 minutes  Total Treatment Minutes: 23 minutes    Electronically signed by:    KEYANA Harmon  Speech-Language Pathologist

## 2021-07-25 NOTE — PROGRESS NOTES
Message left to [facility] to call this RN back to verify home medications. [id number] [facility] reviewed with nurse from [facility]. Med list to be faxed over. Amber [address] list received from [facility] and placed in patient's chart. Home medication list complete.

## 2021-07-25 NOTE — PLAN OF CARE
Problem: Falls - Risk of:  Goal: Will remain free from falls  Description: Will remain free from falls  Outcome: Ongoing  Note: Fall precautions in place: bed locked and in lowest position, bed alarm on, 2/4 side rails raised, non-slip socks on, call light and overhead table within reach, patient knows when to appropriately call out for help.      Goal: Absence of physical injury  Description: Absence of physical injury  Outcome: Ongoing     Problem: Skin Integrity:  Goal: Will show no infection signs and symptoms  Description: Will show no infection signs and symptoms  Outcome: Ongoing  Goal: Absence of new skin breakdown  Description: Absence of new skin breakdown  Outcome: Ongoing

## 2021-07-25 NOTE — FLOWSHEET NOTE
Patient's admission assessment complete at this time. Routine VSS. Patient resting in bed with respirations even and unlabored. Respirations appear even and unlabored. Pt SBA to bathroom. Oriented patient to room and call light, explained to patient to call this RN if she needs to used restroom to help prevent falls. All nightly medications given per MAR. Patient is alert to self only, pleasantly confused. Baseline 2L supplemental oxygen. IV fluids started and IV abx given. No other needs expressed, call light within reach. Will continue to monitor.      Fall precautions in place: bed locked and in lowest position, bed alarm on, 2/4 side rails raised, non-slip socks on, overhead table within reach, patient knows when to appropriately call out for help.        07/24/21 2144   Vital Signs   Temp 97.9 °F (36.6 °C)   Temp Source Oral   Pulse 62   Heart Rate Source Monitor   Resp 18   /64   BP Location Left upper arm   MAP (mmHg) 87   Patient Position Semi fowlers   Level of Consciousness Alert (0)   MEWS Score 1   Patient Currently in Pain Denies   Pain Assessment   Pain Assessment 0-10   Pain Level 0   Oxygen Therapy   SpO2 99 %   Pulse Oximeter Device Mode Intermittent   Pulse Oximeter Device Location Finger   O2 Device Nasal cannula   O2 Flow Rate (L/min) 2 L/min

## 2021-07-26 VITALS
WEIGHT: 124 LBS | DIASTOLIC BLOOD PRESSURE: 78 MMHG | BODY MASS INDEX: 21.17 KG/M2 | TEMPERATURE: 98.3 F | OXYGEN SATURATION: 96 % | HEART RATE: 92 BPM | SYSTOLIC BLOOD PRESSURE: 177 MMHG | HEIGHT: 64 IN | RESPIRATION RATE: 18 BRPM

## 2021-07-26 PROBLEM — I16.0 HYPERTENSIVE URGENCY: Status: ACTIVE | Noted: 2021-07-26

## 2021-07-26 PROBLEM — E87.1 HYPONATREMIA: Status: RESOLVED | Noted: 2017-08-26 | Resolved: 2021-07-26

## 2021-07-26 PROBLEM — E04.9 GOITER: Status: RESOLVED | Noted: 2019-04-05 | Resolved: 2021-07-26

## 2021-07-26 PROBLEM — R09.02 HYPOXEMIA: Status: ACTIVE | Noted: 2021-07-26

## 2021-07-26 PROBLEM — Z71.89 ADVANCE DIRECTIVE DISCUSSED WITH PATIENT: Status: RESOLVED | Noted: 2019-04-05 | Resolved: 2021-07-26

## 2021-07-26 LAB
ANION GAP SERPL CALCULATED.3IONS-SCNC: 11 MMOL/L (ref 3–16)
BUN BLDV-MCNC: 6 MG/DL (ref 7–20)
CALCIUM SERPL-MCNC: 8.2 MG/DL (ref 8.3–10.6)
CHLORIDE BLD-SCNC: 106 MMOL/L (ref 99–110)
CO2: 24 MMOL/L (ref 21–32)
CREAT SERPL-MCNC: 0.6 MG/DL (ref 0.6–1.2)
GFR AFRICAN AMERICAN: >60
GFR NON-AFRICAN AMERICAN: >60
GLUCOSE BLD-MCNC: 111 MG/DL (ref 70–99)
HCT VFR BLD CALC: 33.5 % (ref 36–48)
HEMOGLOBIN: 11.1 G/DL (ref 12–16)
MCH RBC QN AUTO: 29.1 PG (ref 26–34)
MCHC RBC AUTO-ENTMCNC: 33.2 G/DL (ref 31–36)
MCV RBC AUTO: 87.6 FL (ref 80–100)
ORGANISM: ABNORMAL
PDW BLD-RTO: 15.4 % (ref 12.4–15.4)
PLATELET # BLD: 318 K/UL (ref 135–450)
PMV BLD AUTO: 6.9 FL (ref 5–10.5)
POTASSIUM SERPL-SCNC: 3.6 MMOL/L (ref 3.5–5.1)
RBC # BLD: 3.82 M/UL (ref 4–5.2)
SODIUM BLD-SCNC: 141 MMOL/L (ref 136–145)
URINE CULTURE, ROUTINE: ABNORMAL
WBC # BLD: 7.8 K/UL (ref 4–11)

## 2021-07-26 PROCEDURE — G0378 HOSPITAL OBSERVATION PER HR: HCPCS

## 2021-07-26 PROCEDURE — 97161 PT EVAL LOW COMPLEX 20 MIN: CPT

## 2021-07-26 PROCEDURE — 97165 OT EVAL LOW COMPLEX 30 MIN: CPT

## 2021-07-26 PROCEDURE — 6370000000 HC RX 637 (ALT 250 FOR IP): Performed by: INTERNAL MEDICINE

## 2021-07-26 PROCEDURE — 85027 COMPLETE CBC AUTOMATED: CPT

## 2021-07-26 PROCEDURE — 97535 SELF CARE MNGMENT TRAINING: CPT

## 2021-07-26 PROCEDURE — 97116 GAIT TRAINING THERAPY: CPT

## 2021-07-26 PROCEDURE — 36415 COLL VENOUS BLD VENIPUNCTURE: CPT

## 2021-07-26 PROCEDURE — 97530 THERAPEUTIC ACTIVITIES: CPT

## 2021-07-26 PROCEDURE — 96372 THER/PROPH/DIAG INJ SC/IM: CPT

## 2021-07-26 PROCEDURE — 6360000002 HC RX W HCPCS: Performed by: INTERNAL MEDICINE

## 2021-07-26 PROCEDURE — 80048 BASIC METABOLIC PNL TOTAL CA: CPT

## 2021-07-26 RX ORDER — LISINOPRIL 20 MG/1
20 TABLET ORAL DAILY
Qty: 30 TABLET | Refills: 0 | Status: SHIPPED | OUTPATIENT
Start: 2021-07-26

## 2021-07-26 RX ORDER — AMLODIPINE BESYLATE 5 MG/1
10 TABLET ORAL DAILY
Status: DISCONTINUED | OUTPATIENT
Start: 2021-07-26 | End: 2021-07-26 | Stop reason: HOSPADM

## 2021-07-26 RX ORDER — CIPROFLOXACIN 500 MG/1
500 TABLET, FILM COATED ORAL EVERY 12 HOURS SCHEDULED
Status: DISCONTINUED | OUTPATIENT
Start: 2021-07-26 | End: 2021-07-26 | Stop reason: HOSPADM

## 2021-07-26 RX ORDER — LISINOPRIL 20 MG/1
20 TABLET ORAL DAILY
Status: DISCONTINUED | OUTPATIENT
Start: 2021-07-26 | End: 2021-07-26 | Stop reason: HOSPADM

## 2021-07-26 RX ORDER — CIPROFLOXACIN 500 MG/1
500 TABLET, FILM COATED ORAL EVERY 12 HOURS SCHEDULED
Qty: 20 TABLET | Refills: 0 | Status: SHIPPED | OUTPATIENT
Start: 2021-07-26 | End: 2021-07-26

## 2021-07-26 RX ORDER — MONTELUKAST SODIUM 10 MG/1
10 TABLET ORAL NIGHTLY
Qty: 30 TABLET | Refills: 3 | Status: SHIPPED | OUTPATIENT
Start: 2021-07-26

## 2021-07-26 RX ORDER — LISINOPRIL 20 MG/1
20 TABLET ORAL DAILY
Qty: 30 TABLET | Refills: 0 | Status: SHIPPED | OUTPATIENT
Start: 2021-07-26 | End: 2021-07-26

## 2021-07-26 RX ORDER — CIPROFLOXACIN 500 MG/1
500 TABLET, FILM COATED ORAL EVERY 12 HOURS SCHEDULED
Qty: 20 TABLET | Refills: 0 | Status: SHIPPED | OUTPATIENT
Start: 2021-07-26 | End: 2021-08-05

## 2021-07-26 RX ORDER — AMLODIPINE BESYLATE 10 MG/1
10 TABLET ORAL DAILY
Qty: 30 TABLET | Refills: 0 | Status: SHIPPED | OUTPATIENT
Start: 2021-07-27

## 2021-07-26 RX ORDER — MONTELUKAST SODIUM 10 MG/1
10 TABLET ORAL NIGHTLY
Qty: 30 TABLET | Refills: 3 | Status: SHIPPED | OUTPATIENT
Start: 2021-07-26 | End: 2021-07-26

## 2021-07-26 RX ORDER — AMLODIPINE BESYLATE 10 MG/1
10 TABLET ORAL DAILY
Qty: 30 TABLET | Refills: 0 | Status: SHIPPED | OUTPATIENT
Start: 2021-07-27 | End: 2021-07-26

## 2021-07-26 RX ADMIN — LEVOTHYROXINE SODIUM 112 MCG: 0.11 TABLET ORAL at 06:23

## 2021-07-26 RX ADMIN — SERTRALINE 100 MG: 50 TABLET, FILM COATED ORAL at 10:47

## 2021-07-26 RX ADMIN — ASPIRIN 81 MG: 81 TABLET, CHEWABLE ORAL at 10:48

## 2021-07-26 RX ADMIN — AMLODIPINE BESYLATE 10 MG: 5 TABLET ORAL at 10:47

## 2021-07-26 RX ADMIN — CALCIUM CARBONATE-VITAMIN D TAB 500 MG-200 UNIT 1 TABLET: 500-200 TAB at 10:48

## 2021-07-26 RX ADMIN — ATORVASTATIN CALCIUM 40 MG: 40 TABLET, FILM COATED ORAL at 10:47

## 2021-07-26 RX ADMIN — Medication 1 TABLET: at 10:47

## 2021-07-26 RX ADMIN — HYDROCHLOROTHIAZIDE 12.5 MG: 25 TABLET ORAL at 10:48

## 2021-07-26 RX ADMIN — ENOXAPARIN SODIUM 40 MG: 40 INJECTION SUBCUTANEOUS at 10:47

## 2021-07-26 RX ADMIN — LISINOPRIL 20 MG: 20 TABLET ORAL at 10:47

## 2021-07-26 ASSESSMENT — PAIN SCALES - GENERAL
PAINLEVEL_OUTOF10: 0
PAINLEVEL_OUTOF10: 0

## 2021-07-26 NOTE — DISCHARGE INSTR - COC
Continuity of Care Form    Patient Name: Janette Erazo   :  1935  MRN:  [id number]    Admit date:  2021  Discharge date:  2021    Code Status Order: Full Code   Advance Directives:      Admitting Physician:  Carol Ann Campos MD  PCP: Jolene Marin    Discharging Nurse: Abdullahi Lay [facility] Unit/Room#: 0AR-3643/8344-56  Discharging Unit Phone Number: [phone]    Emergency Contact:   Extended Emergency Contact Information  Primary Emergency Contact: Yohannes Reeder  Address: [address] Phone: [phone]  Relation: Spouse  Secondary Emergency Contact: James Fowler [address] Phone: [phone]  Relation: Child    Past Surgical History:  Past Surgical History:   Procedure Laterality Date    ABDOMINAL ADHESION SURGERY  2016    ABDOMINAL HERNIA REPAIR  2016    incarcerated hernia    SMALL INTESTINE SURGERY  2016    small bowel resect    TOTAL HIP ARTHROPLASTY      both hips       Immunization History:   Immunization History   Administered Date(s) Administered    COVID-19, Pfizer, PF, 30mcg/0.3mL 2021, 2021    Tdap (Boostrix, Adacel) 2011       Active Problems:  Patient Active Problem List   Diagnosis Code    HLD (hyperlipidemia) E78.5    Acid reflux K21.9    Generalized OA M15.9    Insomnia G47.00    Osteoporosis M81.0    Unsteady gait R26.81    UTI (urinary tract infection) N39.0    Hypertensive urgency I16.0    Hypoxemia R09.02       Isolation/Infection:   Isolation            No Isolation          Patient Infection Status       None to display            Nurse Assessment:  Last Vital Signs: BP (!) 177/78   Pulse 92   Temp 98.3 °F (36.8 °C) (Oral)   Resp 18   Ht 5' 4\" (1.626 m)   Wt 124 lb (56.2 kg)   SpO2 96%   BMI 21.28 kg/m²     Last documented pain score (0-10 scale): Pain Level: 0  Last Weight:   Wt Readings from Last 1 Encounters:   07/24/21 124 lb (56.2 kg)     Mental Status:  disoriented and alert    IV Access:  - None    Nursing Mobility/ADLs:  Walking   Independent  Transfer  Independent  Bathing  [facility]   whole    Wound Care Documentation and Therapy:  Incision 08/16/16 Groin Right (Active)   Number of days: 8163       Incision 08/24/16 Abdomen (Active)   Number of days: 5805        Elimination:  Continence:   · Bowel: Yes  · Bladder: Yes  Urinary Catheter: None   Colostomy/Ileostomy/Ileal Conduit: No       Date of Last BM:      Intake/Output Summary (Last 24 hours) at 7/26/2021 1302  Last data filed at 7/26/2021 4527  Gross per 24 hour   Intake --   Output 600 ml   Net -600 ml     I/O last 3 completed shifts: In: 350 [P.O.:350]  Out: 600 [Urine:600]    Safety Concerns: At Risk for Falls    Impairments/Disabilities:      None    Nutrition Therapy:  Current Nutrition Therapy:   - Oral Diet:  General    Routes of Feeding: Oral  Liquids: Thin Liquids  Daily Fluid Restriction: no  Last Modified Barium Swallow with Video (Video Swallowing Test): not done    Treatments at the Time of Hospital Discharge:   Respiratory Treatments:    Oxygen Therapy:  is not on home oxygen therapy. Ventilator:    - No ventilator support    Rehab Therapies:   Weight Bearing Status/Restrictions: No weight bearing restirctions  Other Medical Equipment (for information only, NOT a DME order):     Other Treatments:      Patient's personal belongings (please select all that are sent with patient):       RN SIGNATURE:  Electronically signed by Shara Leo RN on 7/26/21 at 4:27 PM EDT    CASE MANAGEMENT/SOCIAL WORK SECTION    Inpatient Status Date: OBSERVATION    Readmission Risk Assessment Score:  Readmission Risk              Risk of Unplanned Readmission:  0           Discharging to Facility/ Agency    Name: [address] [place] [address] phone [phone]  [address], [address]  ·     Dialysis Facility (if applicable)   · Name:  · Address:  · Dialysis Schedule:  · Phone:  · Fax:    / signature:ANSHUL Hewitt, CCM, RN  [facility]  [phone]    PHYSICIAN SECTION    Prognosis: Fair    Condition at Discharge: Stable    Rehab Potential (if transferring to Rehab): Good    Recommended Labs or Other Treatments After Discharge: assisted living      Physician Certification: I certify the above information and transfer of Rosibel Hill  is necessary for the continuing treatment of the diagnosis listed and that she requires Assisted Living for greater 30 days.      Update Admission H&P: No change in H&P    PHYSICIAN SIGNATURE:  Electronically signed by Martin Shah MD on 7/26/21 at 3:14 PM EDT

## 2021-07-26 NOTE — PROGRESS NOTES
Physical Therapy    Facility/Department: [address] ONCOLOGY  Initial Assessment    NAME: Von Penn  : 1935  MRN: [id number]    Date of Service: 2021    Discharge Recommendations:  PT Equipment Recommendations  Equipment Needed: No  Other: Pt states she has a Rollator for community ambulation - as the patient is a poor historian upon evaluation it would be beneficial to confirm this with family. Von Penn scored a 19/ on the AM-PAC short mobility form. At this time, no further PT is recommended upon discharge due to pt functioning near baseline and per pt her spouse can provide  assist.  Recommend patient returns to prior setting with prior services. Assessment   Body structures, Functions, Activity limitations: Decreased functional mobility ; Decreased balance;Decreased safe awareness  Assessment: Pt is an 81 yo female [facility] on  for fever and vomitting. The patient today presents with confusion however without family present it is difficult to determine how far this is from her baseline. The patient requires SBA for transfers and ambulation due to decreased safety awareness and fatigue. The patient would benefit from additional skilled PT in the acute setting for balance and gait training prior to return home. Treatment Diagnosis: Decreased balance  Prognosis: Good  Decision Making: Low Complexity  History: See below  Exam: MMT, balance, functional mobility  Clinical Presentation: Evolving  PT Education: Goals;PT Role;Plan of Care;General Safety  Patient Education: Pt verbalized understanding however will require repetition due to confusion  Barriers to Learning: Cognition  REQUIRES PT FOLLOW UP: Yes  Activity Tolerance  Activity Tolerance: Patient Tolerated treatment well;Patient limited by cognitive status  Activity Tolerance: Pt's BP remained elevated during session, RN aware and cleared pt for therapy.  Vitals after ambulation: /73, HR 85 bpm, SpO2 History  Lives With: Spouse (able to provide 24/7 supervision/assist)  Type of Home: Facility (Independent living at [facility])  Home Layout: One level  Home Access: Elevator  Bathroom Shower/Tub: Walk-in shower, Shower chair with back  Bathroom Toilet: Standard  Bathroom Equipment: Grab bars in shower, Hand-held shower, Grab bars around toilet  [address], 4 wheeled walker, Reacher  ADL Assistance: Needs assistance (Pt's spouse assists with getting into shower)  Homemaking Assistance: Needs assistance (Spouse does all household chores)  Homemaking Responsibilities: No  Ambulation Assistance: Independent (with cane or walker as needed, takes breaks with longer distances)  Transfer Assistance: Independent  Active : No  Patient's  Info:  drives  IADL Comments: Pt sleeps in flat bed. Pt goes to dining room 2x/day for meals. Additional Comments: Pt presents with confusion this date, difficulty describing things coherently. Unable to name where she lives. No falls in the last 6 months. Spouse verified information.      Cognition   See OT eval.    Objective  AROM RLE (degrees)  RLE AROM: WNL  AROM LLE (degrees)  LLE AROM : WNL  Strength RLE  Strength RLE: WFL  Comment: 4+/5 hip flexion, knee ext/flex, ankle DF  Strength LLE  Strength LLE: WFL  Comment: 4+/5 hip flexion, knee ext/flex, ankle DF  Motor Control  Gross Motor?: WFL (Difficulty coordinating alternating toe tapping sitting EOB)     Sensation  Overall Sensation Status: WNL     Bed mobility  Comment: Pt sitting on commode upon therapist arrival and up in chair at end of session, no bed mobility observed this date     Transfers  Sit to Stand: Stand by assistance (SBA from bed, recliner, and toilet without use of grab bar)  Stand to sit: Stand by assistance;Supervision (SBA to toilet without use of grab bar; Supervision from bed and recliner)     Ambulation  Ambulation?: Yes  Ambulation 1  Surface: level tile  Device: No Device  Assistance: Stand by assistance  Quality of Gait: Decreased step length/heigth, decreased trunk rotation, forward head and rounded shoulders, decreased heel strike (B)  Distance: 15 + 100 + 10 + 10 ft  Comments: Pt furntiure walks for short distances in room but no overt LOB when ambulating without UE support    Balance  Posture: Fair (forward head)  Sitting - Static: Good  Sitting - Dynamic: Good;-  Standing - Static: Good;-  Standing - Dynamic: Good;-  Comments: Indep for static sitting balance, SBA for dynamic sitting balance without UE support with lower body dressing on commode. SBA for static and dynamic standing balance at sink without UE support. SBA to pick object up from floor without UE support, required increased time. Other activity  Pt voided bladder in commode x2 trials. Indep with seated pericare. Supervision-SBA for lower body dressing. Plan   Plan  Times per week: 1-2x  Current Treatment Recommendations: Functional Mobility Training, Transfer Training, Balance Training, Endurance Training, Gait Training, Neuromuscular Re-education, Home Exercise Program, Safety Education & Training, Patient/Caregiver Education & Training, Equipment Evaluation, Education, & procurement  Safety Devices  Type of devices:  All fall risk precautions in place, Call light within reach, Chair alarm in place, Patient at risk for falls, Nurse notified, Ltanya Cindy in use, Left in chair, Gait belt    AM-PAC Score  AM-PAC Inpatient Mobility Raw Score : 19 (07/26/21 1011)  AM-PAC Inpatient T-Scale Score : 45.44 (07/26/21 1011)  Mobility Inpatient CMS 0-100% Score: 41.77 (07/26/21 1011)  Mobility Inpatient CMS G-Code Modifier : CK (07/26/21 1011)    Goals  Short term goals  Time Frame for Short term goals: Before discharge  Short term goal 1: Pt will complete bed mobility indep  Short term goal 2: Pt will complete sit<>stand indep from multiple surfaces  Short term goal 3: Pt will ambulate 100 ft indep without AD  Patient Goals   Patient goals : Go home today       Therapy Time   Individual Concurrent Group Co-treatment   Time In [facility]         Time Out 0934         Minutes 62         Timed Code Treatment Minutes: [address], [address], FELY #[id number]

## 2021-07-26 NOTE — PROGRESS NOTES
Patient discharged back to [address] [place] [address] with , IV removed. Discharge instructions provided and explained, prescriptions sent with patient. Report called to [facility] at [facility]. Patient transported to [facility] by .

## 2021-07-26 NOTE — PROGRESS NOTES
Occupational Therapy   Occupational Therapy Initial Assessment/Discharge Summary  Date: 2021   Patient Name: Miranda Garcia  MRN: [id number]     : 1935    Date of Service: 2021    Discharge Recommendations:Susu Alvarezgia Willingham scored a  on the -[facility] ADL Inpatient form. At this time, no further OT is recommended upon discharge due to patient near baseline. Recommend patient returns to prior setting with prior services. 24 hour supervision at [facility]        OT Equipment Recommendations  Equipment Needed: No    Assessment   Assessment: Patient near baseline function. No skilled OT indicated at this time. Recommend returned to [facility] with spouse. Treatment Diagnosis: UTI  Prognosis: Good  Decision Making: Low Complexity  OT Education: OT Role;Plan of Care  Patient Education: Patient is not an independent learner. Spouse present for learning. Barriers to Learning: memory  REQUIRES OT FOLLOW UP: No  Activity Tolerance  Activity Tolerance: Patient Tolerated treatment well  Activity Tolerance: Patient displays anxiety as evidenced by stating, \"I just can't think straight. Why won't they let me go home? I miss being with my . \"  Safety Devices  Safety Devices in place: Yes  Type of devices: All fall risk precautions in place;Call light within reach; Chair alarm in place; Left in chair;Patient at risk for falls;Gait belt;Nurse notified           Patient Diagnosis(es): The primary encounter diagnosis was Septicemia [facility]). Diagnoses of Acute cystitis without hematuria, LBBB (left bundle branch block), and Elevated troponin were also pertinent to this visit. has a past medical history of Hyperlipidemia, Hypertension, and Thyroid disease. has a past surgical history that includes Total hip arthroplasty; Abdominal hernia repair (2016); Small intestine surgery (2016); and Abdominal adhesion surgery (2016).     Treatment Diagnosis: UTI      Restrictions  Restrictions/Precautions  Restrictions/Precautions: Fall Risk (high fall risk)  Position Activity Restriction  Other position/activity restrictions: Pt admitted to [facility] on 7/24 from [facility], brought in by her  for fever and vomitting. Found to have sepsis, new (L) bundle branch block, and elevated troponin. Hx: hyperlipidemia, HTN, thyroid disease    Subjective   General  Chart Reviewed: Yes  Patient assessed for rehabilitation services?: Yes  Additional Pertinent Hx: HTN, hernia  Family / Caregiver Present: Yes Sonu Pires, spouse)  Diagnosis: UTI  Subjective  Subjective: Patient in chair. States, \"I am so beside myself. I want to go home. \" Denies pain  Patient Currently in Pain: Denies  Vital Signs  Pulse: 81  BP: (!) 191/75  Patient Currently in Pain: Denies  Social/Functional History  Social/Functional History  Lives With: Spouse (able to provide 24/7 supervision/assist)  Type of Home: Facility (Independent living at [facility])  Home Layout: One level  Home Access: Elevator  Bathroom Shower/Tub: Walk-in shower, Shower chair with back  Bathroom Toilet: Standard  Bathroom Equipment: Grab bars in shower, Hand-held shower, Grab bars around toilet  Home Equipment: Linzie Lipoma, 4 wheeled walker, Reacher  ADL Assistance: Needs assistance (Pt's spouse assists with getting into shower)  Homemaking Assistance: Needs assistance (Spouse does all household chores)  Homemaking Responsibilities: No  Ambulation Assistance: Independent (with cane or walker as needed, takes breaks with longer distances)  Transfer Assistance: Independent  Active : No  Patient's  Info:  drives  IADL Comments: Pt sleeps in flat bed. Pt goes to dining room 2x/day for meals. Additional Comments: Pt presents with confusion this date, difficulty describing things coherently. Unable to name where she lives. No falls in the last 6 months. Spouse verified information.        Objective   Vision: Impaired  Vision Exceptions: Wears glasses at all times  Hearing: Within functional limits    Orientation  Overall Orientation Status: Impaired  Orientation Level: Oriented to person;Disoriented to situation;Disoriented to time;Disoriented to place (unable to recall birthdate)     Balance  Sitting Balance: Supervision  Standing Balance: Stand by assistance (No device)  Functional Mobility  Functional - Mobility Device: No device  Assist Level: Stand by assistance  Toilet Transfers  Toilet - Technique: Ambulating  Equipment Used: Standard toilet  Toilet Transfer: Stand by assistance  ADL  Feeding: Modified independent  (Just completed partial breakfast)  Grooming: Stand by assistance;Verbal cueing (stance at sink)  LE Dressing: Stand by assistance (seated in chair)  Toileting: Stand by assistance  Tone RUE  RUE Tone: Normotonic  Tone LUE  LUE Tone: Normotonic  Coordination  Movements Are Fluid And Coordinated: Yes        Transfers  Stand Step Transfers: Stand by assistance  Sit to stand: Stand by assistance  Stand to sit: Stand by assistance     Cognition  Overall Cognitive Status: Exceptions  Arousal/Alertness: Appropriate responses to stimuli  Following Commands:  Follows one step commands consistently  Attention Span: Attends with cues to redirect  Memory: Decreased recall of recent events;Decreased short term memory  Safety Judgement: Decreased awareness of need for safety;Decreased awareness of need for assistance  Problem Solving: Decreased awareness of errors  Insights: Decreased awareness of deficits  Initiation: Requires cues for some  Sequencing: Requires cues for some        Sensation  Overall Sensation Status: WNL        LUE AROM (degrees)  LUE AROM : WFL  Left Hand AROM (degrees)  Left Hand AROM: WFL  RUE AROM (degrees)  RUE AROM : WFL  Right Hand AROM (degrees)  Right Hand AROM: WFL  LUE Strength  Gross LUE Strength: WFL  RUE Strength  Gross RUE Strength: WFL                   Plan   Plan  Times per week: eval and discharge      AM-PAC Score        AM-PAC Inpatient Daily Activity Raw Score: 19 (07/26/21 1132)  AM-PAC Inpatient ADL T-Scale Score : 40.22 (07/26/21 1132)  ADL Inpatient CMS 0-100% Score: 42.8 (07/26/21 1132)  ADL Inpatient CMS G-Code Modifier : CK (07/26/21 1132)    Goals  Short term goals  Time Frame for Short term goals: No goals set  Patient Goals   Patient goals : HOME       Therapy Time   Individual Concurrent Group Co-treatment   Time In 1001         Time Out 1045         Minutes 44              Timed Code Treatment Minutes: 29  Total Treatment Minutes:  [address] [facility], [address] [place] [address] Paulino Perez 103

## 2021-07-26 NOTE — CARE COORDINATION
Discharge Planning Assessment  Readmission risk score 13%  RN discharge planner met with patient/ (and family member) to discuss reason for admission, current living situation, and potential needs at the time of discharge    Demographics/Insurance verified yes    Current type of dwelling:assisted living [facility]    Patient from ECF/SW confirmed with:     Living arrangements: [facility], with her     Level of function/Support: ALU    PCP: per facility    Last Visit to PCP:    DME: shower     Active with any community resources/agencies/skilled home care: none    Medication compliance issues: per facility    Financial issues that could impact healthcare: concerned about cost of care    Tentative discharge plan: return to [facility]    Discussed and provided facilities of choice if transition to a skilled nursing facility is required at the time of discharge      *Discussed with patient and/or family that on the day of discharge home tentative time of discharge will be between 10 AM and noon.     Transportation at the time of discharge: spouse    ANSHUL Ya, CCM, RN  [facility]  [phone]

## 2021-07-26 NOTE — H&P
[place] [address]                     [address] [facility], [address]                              HISTORY AND PHYSICAL    PATIENT NAME: Ousmane Calixto                   :        1935  MED REC NO:   [id number]                          ROOM:       5581  ACCOUNT NO:   [de-identified]                           ADMIT DATE: 2021  PROVIDER:     Euell Babinski, MD    HISTORY OF PRESENT ILLNESS:  The patient is an 82-year-old white  American lady, brought in from [facility] with a history of fever,  emesis, feeling overall weak. She had some underlying early dementia  and needs some assistance in activities of daily living. There was no  abdominal pain. No angina pectoris. She did have some diarrhea. She  felt like there was some stomach flu going around in the community. She  was evaluated further and was found to have a urinary tract infection. PAST MEDICAL HISTORY:  Pertinent for hypertension, hypothyroidism,  hyperlipidemia, dementia. PAST SURGICAL HISTORY:  Pertinent for total arthroplasty of the hip,  abdominal hernia repair, small intestinal surgery, abdominal adhesion  surgery. FAMILY HISTORY:  Both the parents are  because of natural  causes. No further history available. MEDICATIONS:  The patient is on amlodipine, lisinopril, atorvastatin,  calcium with vitamin D, donepezil, fenofibrate, hydrocodone,  levothyroxine, hydrochlorothiazide, metoprolol, montelukast,  multivitamin, naproxen, omeprazole, sertraline and levothyroxine. ALLERGIES:  The patient is allergic to CODEINE. SOCIAL HISTORY:  The patient is a  woman. She has three grown  children. She quit smoking five years ago. No history of alcohol usage  of any significance. She used to work as an . She does  not have any history of substance abuse. She and her  recently  moved to [facility].     REVIEW OF SYSTEMS: Negative for loss of consciousness. Does feel  febrile, some chills, malaise, poor appetite, nausea, emesis x1, some  diarrhea. No abdominal pain. No genitourinary complaint except some  dysuria and urinary frequency. Does have chronic musculoskeletal pain. No seizure activity. No TIA. No speech disturbance. No vision  disturbance. PHYSICAL EXAMINATION:  GENERAL:  Alert, awake, oriented x3 82year-old pleasant white American  lady. Looks very thin built, BMI 21.28. VITAL SIGNS:  Her temperature 98.3, blood pressure 192/79, respirations  18, heart rate 74, O2 sat 99% on 2 liter. She was 94% on room air. VITAL SIGNS:  Her temperature was 101.2 on arrival.  Her weight is 124  pounds, body mass index 21.3. HEENT:  Oral mucosa dry. SKIN:  Skin is warm and dry. NECK:  Neck is supple. No jugular venous distention. No  lymphadenopathy. No thyromegaly. LUNGS:  Vesicular breath sounds. Poor inspiration. HEART:  Regular rate and rhythm. S1, S2 without any S3 or S4 gallop. ABDOMEN:  Soft. There is no localized tenderness. No guarding, no  rebound, no rigidity. Bowel sounds are present. EXTREMITIES:  Shows no cyanosis or edema. Distal pulsations are weak. NEUROLOGIC:  Grossly intact. LABORATORY EVALUATION:  Shows sodium 139, potassium 3.7, chloride 104,  CO2 26, BUN 8, creatinine 0.7, anion gap is 9, blood sugar is 90,  calcium is 8.1. Troponin 0.01. White blood cell count is 9.7,  hemoglobin/hematocrit 10.2 and 31, platelet count is 897. Urinalysis  shows significant urinary tract infection. Urine culture initially  drawn already showing some bacterial growth, the sensitivity is pending. There are 14 wbc's, small leukocyte esterase, positive nitrites, 4+  bacteria. ASSESSMENT:  Urinary tract infection, moderate dementia, uncontrolled  hypertension, osteoporosis, insomnia. PLAN:  Get her admitted.   Treat her with IV hydration, IV ceftriaxone,  urine culture and sensitivity, PT and OT evaluation. Increase  amlodipine and lisinopril dosage. We will also do a speech eval.  DVT  prophylaxis.     As always, it is a pleasure and privilege to take care of your patients  at [facility] Lisbet Meza MD    D: 07/26/2021 8:00:27       T: 07/26/2021 8:03:45     [email or web address]  Job#: [id number]     Doc#: [id number]    CC:  [facility]

## 2021-07-26 NOTE — PROGRESS NOTES
Notified Dr. Cj De Los Santos that patient is now on RA and not needing oxygen.   Received order from Dr. Cj De Los Santos to discontinue chest XR and telemetry

## 2021-07-26 NOTE — PROGRESS NOTES
Patient Active Problem List   Diagnosis    HLD (hyperlipidemia)    Acid reflux    Generalized OA    Insomnia    Osteoporosis    Unsteady gait    UTI (urinary tract infection)    Hypertensive urgency    Hypoxemia   H&P dictated

## 2021-07-26 NOTE — PROGRESS NOTES
[address] [facility]                                            Advanced Care Planning Note. Purpose of Encounter: Advanced care planning in light of dementia  And advanced age   Parties In Attendance: Patient,  Her  present in the room    Decisional Capacity: Yes only partial ,  and all the children helped in decision process   Subjective: Patient/family understand that this conversation is to address long term care goal  Objective:   CPR-  Yes  Mechanical Vent- Yes   Defibrillation- Yes   Gastrostomy if needed- Yes  Invasive procedures and surgeries if ever needed- Yes   Hemodialysis -Yes   Goals of Care Determination: Patient/POA   Code Status:  Full code  Time spent on Advanced care Plannin minutes   Advanced Care Planning Documents: Completed advanced directives on chart,   [place] Mihaela  is the POA.     Corrie Lyles MD  2021 8:06 AM

## 2021-07-28 LAB — BLOOD CULTURE, ROUTINE: NORMAL

## 2021-07-29 LAB — CULTURE, BLOOD 2: NORMAL

## 2021-08-23 PROBLEM — N39.0 UTI (URINARY TRACT INFECTION): Status: RESOLVED | Noted: 2021-07-24 | Resolved: 2021-08-23

## 2021-08-26 NOTE — PROGRESS NOTES
Patient seen , discharge dictated scripts given , arrangements made , DELORES completed .  Discussed with nursing staff  And   If applicable ,  Discussed with  Patient's family , all questions answered and concerns addressed  When applicable

## 2021-08-27 NOTE — DISCHARGE SUMMARY
[facility]                     350 [facility], [address]                               DISCHARGE SUMMARY    PATIENT NAME: Loretta Bender                   :        1935  MED REC NO:   [id number]                          ROOM:       5581  ACCOUNT NO:   [de-identified]                           ADMIT DATE: 2021  PROVIDER:     Samantha Christianson MD                  DISCHARGE DATE:  2021    FINAL DIAGNOSES:  1. Urinary tract infection. 2.  Moderate dementia. 3.  Uncontrolled hypertension. 4.  Osteoporosis. 5.  Insomnia. DISCHARGE MEDICATIONS:  1.  Montelukast 10 mg once a day. 2.  Norvasc 10 mg once day. 3.  Cipro 500 mg p.o. b.i.d. for 10 days. 4.  Zestril 20 mg once a day. 5.  Multivitamin once a day. 6.  Aricept 5 mg once a day. 7.  Aspirin 81 mg once a day. 8.  Zoloft 100 mg daily. 9.  Calcium 600 mg daily. 10.  Atorvastatin 40 mg once a day. 11.  Levothyroxine 112 mcg once a day. HOSPITAL COURSE:  This elderly woman came to the emergency room with  history of altered mental status, fever, feeling overall weak,  underlying dementia, needs assistance with activities of daily living. She was afebrile. Blood pressure 192/79, respirations 18, heart rate  74, O2 sat 99% on two liters, 94% on room air. Temperature on arrival  was 101.2, but it subsided. Sodium was 139, potassium 3.7, chloride  104, CO2 of 26, BUN 8, creatinine 0.7, anion gap is 9, blood sugar is  90, calcium is 8.1. Troponin was 0.01. Urinalysis shows significant  urinary tract infection. White blood cell count 9.7. The patient had  14 wbc's with leukocyte esterase present. Treated with IV hydration, IV  ceftriaxone, urine culture and sensitivity, PT and OT evaluation. General condition was stabilized. Urine culture was obtained. The  patient did have E. coli with sensitivity to all the commonly tested  antibiotic.   The patient was switched on oral ciprofloxacin. She was  not allergic to quinolones. The patient was discharged in stable  condition back to the nursing home at [facility] THE [facility].         Renetta Walker MD    D: 08/26/2021 8:40:44       T: 08/27/2021 4:41:51     [email or web address]  Job#: [id number]     Doc#: [id number]    CC:

## 2024-05-06 NOTE — PROGRESS NOTES
Patients head to toe assessment completed. Vital signs WNL. Bed alarm engaged, call light within reach. Scheduled medications given per MAR. Patient denies any pain at the moment. Patient resting in bed. Will continue to monitor. Karol: Anemia likely 2/2 GIB 2/2 DAPT. Check Hb. Call Cards. Notify GI. Admit.